# Patient Record
Sex: FEMALE | Race: WHITE | Employment: UNEMPLOYED
[De-identification: names, ages, dates, MRNs, and addresses within clinical notes are randomized per-mention and may not be internally consistent; named-entity substitution may affect disease eponyms.]

---

## 2017-04-28 ENCOUNTER — PRIOR ORIGINAL RECORDS (OUTPATIENT)
Dept: OTHER | Age: 62
End: 2017-04-28

## 2017-11-06 ENCOUNTER — PRIOR ORIGINAL RECORDS (OUTPATIENT)
Dept: OTHER | Age: 62
End: 2017-11-06

## 2017-11-27 ENCOUNTER — MYAURORA ACCOUNT LINK (OUTPATIENT)
Dept: OTHER | Age: 62
End: 2017-11-27

## 2017-11-27 ENCOUNTER — PRIOR ORIGINAL RECORDS (OUTPATIENT)
Dept: OTHER | Age: 62
End: 2017-11-27

## 2017-11-28 ENCOUNTER — PRIOR ORIGINAL RECORDS (OUTPATIENT)
Dept: OTHER | Age: 62
End: 2017-11-28

## 2017-12-11 ENCOUNTER — PRIOR ORIGINAL RECORDS (OUTPATIENT)
Dept: OTHER | Age: 62
End: 2017-12-11

## 2017-12-27 LAB
FREE T4: 1.1 MG/DL
THYROID STIMULATING HORMONE: 11.98 MLU/L
THYROID STIMULATING HORMONE: 11.98 MLU/L
VITAMIN D 25-OH: 57 NG/ML

## 2018-11-05 ENCOUNTER — MYAURORA ACCOUNT LINK (OUTPATIENT)
Dept: OTHER | Age: 63
End: 2018-11-05

## 2018-11-05 ENCOUNTER — PRIOR ORIGINAL RECORDS (OUTPATIENT)
Dept: OTHER | Age: 63
End: 2018-11-05

## 2018-11-09 ENCOUNTER — PRIOR ORIGINAL RECORDS (OUTPATIENT)
Dept: OTHER | Age: 63
End: 2018-11-09

## 2018-12-27 ENCOUNTER — PRIOR ORIGINAL RECORDS (OUTPATIENT)
Dept: OTHER | Age: 63
End: 2018-12-27

## 2019-02-28 VITALS
WEIGHT: 200 LBS | DIASTOLIC BLOOD PRESSURE: 60 MMHG | BODY MASS INDEX: 34.15 KG/M2 | OXYGEN SATURATION: 99 % | HEIGHT: 64 IN | HEART RATE: 112 BPM | SYSTOLIC BLOOD PRESSURE: 120 MMHG

## 2019-02-28 VITALS
BODY MASS INDEX: 34.3 KG/M2 | DIASTOLIC BLOOD PRESSURE: 60 MMHG | WEIGHT: 200.9 LBS | SYSTOLIC BLOOD PRESSURE: 100 MMHG | HEART RATE: 84 BPM | HEIGHT: 64 IN | RESPIRATION RATE: 16 BRPM

## 2019-02-28 VITALS — DIASTOLIC BLOOD PRESSURE: 60 MMHG | SYSTOLIC BLOOD PRESSURE: 96 MMHG | HEIGHT: 64 IN | HEART RATE: 98 BPM

## 2019-03-14 ENCOUNTER — APPOINTMENT (OUTPATIENT)
Dept: FAMILY MEDICINE | Age: 64
End: 2019-03-14

## 2019-03-26 ENCOUNTER — TELEPHONE (OUTPATIENT)
Dept: FAMILY MEDICINE | Age: 64
End: 2019-03-26

## 2019-03-26 ENCOUNTER — OFFICE VISIT (OUTPATIENT)
Dept: FAMILY MEDICINE | Age: 64
End: 2019-03-26

## 2019-03-26 VITALS
BODY MASS INDEX: 35.82 KG/M2 | HEIGHT: 65 IN | HEART RATE: 76 BPM | DIASTOLIC BLOOD PRESSURE: 86 MMHG | WEIGHT: 215 LBS | SYSTOLIC BLOOD PRESSURE: 118 MMHG

## 2019-03-26 DIAGNOSIS — E61.2 MAGNESIUM DEFICIENCY: ICD-10-CM

## 2019-03-26 DIAGNOSIS — E27.40 ADRENAL INSUFFICIENCY (CMD): ICD-10-CM

## 2019-03-26 DIAGNOSIS — R53.83 FATIGUE, UNSPECIFIED TYPE: ICD-10-CM

## 2019-03-26 DIAGNOSIS — E55.9 VITAMIN D DEFICIENCY: ICD-10-CM

## 2019-03-26 DIAGNOSIS — M79.7 FIBROMYALGIA: ICD-10-CM

## 2019-03-26 DIAGNOSIS — E53.8 B12 DEFICIENCY: ICD-10-CM

## 2019-03-26 DIAGNOSIS — E61.1 IRON DEFICIENCY: ICD-10-CM

## 2019-03-26 DIAGNOSIS — R53.81 PHYSICAL DECONDITIONING: Primary | ICD-10-CM

## 2019-03-26 DIAGNOSIS — R52 BODY ACHES: ICD-10-CM

## 2019-03-26 DIAGNOSIS — G47.9 SLEEP DISORDER: ICD-10-CM

## 2019-03-26 PROCEDURE — 99201 OFFICE/OUTPT VISIT,NEW,LEVL I: CPT | Performed by: FAMILY MEDICINE

## 2019-03-26 SDOH — HEALTH STABILITY: MENTAL HEALTH: HOW OFTEN DO YOU HAVE A DRINK CONTAINING ALCOHOL?: NEVER

## 2019-03-26 ASSESSMENT — PATIENT HEALTH QUESTIONNAIRE - PHQ9
1. LITTLE INTEREST OR PLEASURE IN DOING THINGS: NOT AT ALL
SUM OF ALL RESPONSES TO PHQ9 QUESTIONS 1 AND 2: 0
2. FEELING DOWN, DEPRESSED OR HOPELESS: NOT AT ALL
SUM OF ALL RESPONSES TO PHQ9 QUESTIONS 1 AND 2: 0

## 2019-05-22 ENCOUNTER — APPOINTMENT (OUTPATIENT)
Dept: CARDIOLOGY | Age: 64
End: 2019-05-22

## 2019-05-28 ENCOUNTER — APPOINTMENT (OUTPATIENT)
Dept: CARDIOLOGY | Age: 64
End: 2019-05-28

## 2019-05-28 ENCOUNTER — DOCUMENTATION (OUTPATIENT)
Dept: CARDIOLOGY | Age: 64
End: 2019-05-28

## 2019-05-29 ENCOUNTER — TELEPHONE (OUTPATIENT)
Dept: CARDIOLOGY | Age: 64
End: 2019-05-29

## 2019-10-16 RX ORDER — CYCLOBENZAPRINE HYDROCHLORIDE 7.5 MG/1
7.5 TABLET, FILM COATED ORAL AT BEDTIME
COMMUNITY
Start: 2018-11-05

## 2019-10-16 RX ORDER — ATENOLOL 50 MG/1
50 TABLET ORAL DAILY
COMMUNITY
End: 2019-10-21 | Stop reason: SDUPTHER

## 2019-10-16 RX ORDER — POTASSIUM CHLORIDE 750 MG/1
10 CAPSULE, EXTENDED RELEASE ORAL
COMMUNITY
Start: 2007-05-10 | End: 2019-10-21 | Stop reason: SDUPTHER

## 2019-10-16 RX ORDER — MULTIVIT-MIN/IRON FUM/FOLIC AC 7.5 MG-4
1 TABLET ORAL
COMMUNITY

## 2019-10-16 RX ORDER — LEVOTHYROXINE SODIUM 0.07 MG/1
75 TABLET ORAL DAILY
COMMUNITY
Start: 2019-05-16

## 2019-10-20 PROBLEM — I48.21 PERMANENT ATRIAL FIBRILLATION (CMD): Status: ACTIVE | Noted: 2019-10-20

## 2019-10-20 PROBLEM — G47.31 SLEEP APNEA, PRIMARY CENTRAL: Status: ACTIVE | Noted: 2019-10-20

## 2019-10-20 PROBLEM — E03.9 HYPOTHYROIDISM: Status: ACTIVE | Noted: 2019-10-20

## 2019-10-21 ENCOUNTER — OFFICE VISIT (OUTPATIENT)
Dept: CARDIOLOGY | Age: 64
End: 2019-10-21

## 2019-10-21 VITALS
BODY MASS INDEX: 35.32 KG/M2 | DIASTOLIC BLOOD PRESSURE: 60 MMHG | WEIGHT: 212 LBS | OXYGEN SATURATION: 98 % | HEIGHT: 65 IN | HEART RATE: 110 BPM | SYSTOLIC BLOOD PRESSURE: 110 MMHG

## 2019-10-21 DIAGNOSIS — E03.9 HYPOTHYROIDISM, UNSPECIFIED TYPE: ICD-10-CM

## 2019-10-21 DIAGNOSIS — I48.21 PERMANENT ATRIAL FIBRILLATION (CMD): Primary | ICD-10-CM

## 2019-10-21 DIAGNOSIS — G47.31 SLEEP APNEA, PRIMARY CENTRAL: ICD-10-CM

## 2019-10-21 PROCEDURE — 99214 OFFICE O/P EST MOD 30 MIN: CPT | Performed by: INTERNAL MEDICINE

## 2019-10-21 RX ORDER — POTASSIUM CHLORIDE 750 MG/1
10 CAPSULE, EXTENDED RELEASE ORAL DAILY
Qty: 100 CAPSULE | Refills: 3 | Status: SHIPPED | OUTPATIENT
Start: 2019-10-21 | End: 2019-11-08 | Stop reason: ALTCHOICE

## 2019-10-21 RX ORDER — ATENOLOL 50 MG/1
50 TABLET ORAL DAILY
Qty: 100 TABLET | Refills: 3 | Status: SHIPPED | OUTPATIENT
Start: 2019-10-21 | End: 2019-10-21 | Stop reason: DRUGHIGH

## 2019-10-21 RX ORDER — ATENOLOL 25 MG/1
TABLET ORAL
Qty: 90 TABLET | Refills: 3 | Status: SHIPPED
Start: 2019-10-21 | End: 2020-10-05

## 2019-10-21 SDOH — HEALTH STABILITY: MENTAL HEALTH: HOW OFTEN DO YOU HAVE A DRINK CONTAINING ALCOHOL?: NEVER

## 2019-10-21 ASSESSMENT — ENCOUNTER SYMPTOMS
SUSPICIOUS LESIONS: 0
COUGH: 0
HEMOPTYSIS: 0
ALLERGIC/IMMUNOLOGIC COMMENTS: NO NEW FOOD ALLERGIES
WEIGHT GAIN: 0
CHILLS: 0
FEVER: 0
HEMATOCHEZIA: 0
WEIGHT LOSS: 0
BRUISES/BLEEDS EASILY: 0

## 2019-10-21 ASSESSMENT — PATIENT HEALTH QUESTIONNAIRE - PHQ9
1. LITTLE INTEREST OR PLEASURE IN DOING THINGS: NOT AT ALL
SUM OF ALL RESPONSES TO PHQ9 QUESTIONS 1 AND 2: 0
SUM OF ALL RESPONSES TO PHQ9 QUESTIONS 1 AND 2: 0
2. FEELING DOWN, DEPRESSED OR HOPELESS: NOT AT ALL

## 2019-11-08 ENCOUNTER — TELEPHONE (OUTPATIENT)
Dept: CARDIOLOGY | Age: 64
End: 2019-11-08

## 2019-11-08 DIAGNOSIS — I48.21 PERMANENT ATRIAL FIBRILLATION (CMD): ICD-10-CM

## 2019-11-08 RX ORDER — POTASSIUM CHLORIDE 750 MG/1
CAPSULE, EXTENDED RELEASE ORAL
COMMUNITY
Start: 2007-05-10 | End: 2019-11-08 | Stop reason: ALTCHOICE

## 2019-11-08 RX ORDER — POTASSIUM CHLORIDE 750 MG/1
TABLET, EXTENDED RELEASE ORAL
Qty: 100 TABLET | Refills: 5 | Status: SHIPPED | OUTPATIENT
Start: 2019-11-08 | End: 2020-07-02 | Stop reason: SDUPTHER

## 2019-11-08 RX ORDER — POTASSIUM CHLORIDE 750 MG/1
10 TABLET, EXTENDED RELEASE ORAL
COMMUNITY
Start: 2018-11-05 | End: 2019-11-08 | Stop reason: SDUPTHER

## 2019-11-18 ENCOUNTER — OFFICE VISIT (OUTPATIENT)
Dept: CARDIOLOGY | Age: 64
End: 2019-11-18

## 2019-11-18 VITALS
OXYGEN SATURATION: 97 % | HEIGHT: 65 IN | WEIGHT: 216.3 LBS | DIASTOLIC BLOOD PRESSURE: 80 MMHG | HEART RATE: 92 BPM | SYSTOLIC BLOOD PRESSURE: 118 MMHG | BODY MASS INDEX: 36.04 KG/M2

## 2019-11-18 DIAGNOSIS — I48.21 PERMANENT ATRIAL FIBRILLATION (CMD): Primary | ICD-10-CM

## 2019-11-18 PROCEDURE — 99213 OFFICE O/P EST LOW 20 MIN: CPT | Performed by: NURSE PRACTITIONER

## 2019-11-18 SDOH — HEALTH STABILITY: MENTAL HEALTH: HOW OFTEN DO YOU HAVE A DRINK CONTAINING ALCOHOL?: NEVER

## 2019-11-18 ASSESSMENT — ENCOUNTER SYMPTOMS
CHILLS: 0
BRUISES/BLEEDS EASILY: 0
HEMATOCHEZIA: 0
WEIGHT GAIN: 0
HEMOPTYSIS: 0
COUGH: 0
FEVER: 0
WEIGHT LOSS: 0
SUSPICIOUS LESIONS: 0

## 2020-03-11 ENCOUNTER — OFFICE VISIT (OUTPATIENT)
Dept: ENDOCRINOLOGY CLINIC | Facility: CLINIC | Age: 65
End: 2020-03-11
Payer: MEDICARE

## 2020-03-11 VITALS
HEART RATE: 82 BPM | BODY MASS INDEX: 38 KG/M2 | SYSTOLIC BLOOD PRESSURE: 117 MMHG | DIASTOLIC BLOOD PRESSURE: 72 MMHG | WEIGHT: 219 LBS

## 2020-03-11 DIAGNOSIS — E03.8 HYPOTHYROIDISM DUE TO HASHIMOTO'S THYROIDITIS: Primary | ICD-10-CM

## 2020-03-11 DIAGNOSIS — E06.3 HYPOTHYROIDISM DUE TO HASHIMOTO'S THYROIDITIS: Primary | ICD-10-CM

## 2020-03-11 PROCEDURE — 99204 OFFICE O/P NEW MOD 45 MIN: CPT | Performed by: INTERNAL MEDICINE

## 2020-03-11 PROCEDURE — G0463 HOSPITAL OUTPT CLINIC VISIT: HCPCS | Performed by: INTERNAL MEDICINE

## 2020-03-11 RX ORDER — LIOTHYRONINE SODIUM 5 UG/1
5 TABLET ORAL DAILY
Qty: 90 TABLET | Refills: 1 | Status: SHIPPED | OUTPATIENT
Start: 2020-03-11 | End: 2020-08-21

## 2020-03-11 RX ORDER — LEVOTHYROXINE SODIUM 0.07 MG/1
75 TABLET ORAL
Qty: 90 TABLET | Refills: 1 | Status: SHIPPED | OUTPATIENT
Start: 2020-03-11 | End: 2020-08-21

## 2020-03-11 NOTE — PROGRESS NOTES
Name: Clovis Slipper  Date: 3/11/2020    Referring Physician: No ref. provider found    Patient presents with:  Consult: Pt would like to establish care with Endocrinologist to help manage thyroid and adrenal issues.        HISTORY OF PRESENT ILLNESS   Lauryn daily as directed, Disp: , Rfl: 9  •  atenolol 50 MG Oral Tab, Take 50 mg by mouth daily. , Disp: , Rfl:   •  Levothyroxine Sodium 88 MCG Oral Tab, Take 88 mcg by mouth before breakfast., Disp: , Rfl:   •  Cyclobenzaprine HCl 7.5 MG Oral Tab, Take 7.5 mg by & Nails:  normal scalp hair     Neuro:  sensory grossly intact and motor grossly intact  Psychiatric:  oriented to time, self, and place  Nutritional:  no abnormal weight gain or loss    ASSESSMENT/PLAN:    1.  Hypothyroidism  - Discussed diagnosis with soren

## 2020-06-08 ENCOUNTER — APPOINTMENT (OUTPATIENT)
Dept: CARDIOLOGY | Age: 65
End: 2020-06-08

## 2020-06-10 ENCOUNTER — TELEPHONE (OUTPATIENT)
Dept: ENDOCRINOLOGY CLINIC | Facility: CLINIC | Age: 65
End: 2020-06-10

## 2020-06-10 DIAGNOSIS — E06.3 HYPOTHYROIDISM DUE TO HASHIMOTO'S THYROIDITIS: Primary | ICD-10-CM

## 2020-06-10 DIAGNOSIS — E03.8 HYPOTHYROIDISM DUE TO HASHIMOTO'S THYROIDITIS: Primary | ICD-10-CM

## 2020-06-10 NOTE — TELEPHONE ENCOUNTER
Called patient back and informed her we will mail her orders for labs and ultrasound  Per dr Vidhya Gant verbal no need to fast and may take medication am of testing. Per lov: 3/11/20  Repeat labs in 2 mo. Orders placed printed and mailed.

## 2020-06-10 NOTE — TELEPHONE ENCOUNTER
Pt states she would like to have her lab orders in her hand and ask if you could mail them to her. .. I told her they were in the system and she got Very angry and wants them in her hand, Please advise between 10 and noon. Jam Downing

## 2020-07-01 ENCOUNTER — TELEPHONE (OUTPATIENT)
Dept: CARDIOLOGY | Age: 65
End: 2020-07-01

## 2020-07-02 RX ORDER — POTASSIUM CHLORIDE 750 MG/1
TABLET, EXTENDED RELEASE ORAL
Qty: 100 TABLET | Refills: 5 | Status: SHIPPED | COMMUNITY
Start: 2020-07-02 | End: 2021-04-29 | Stop reason: SDUPTHER

## 2020-07-07 ENCOUNTER — HOSPITAL ENCOUNTER (OUTPATIENT)
Dept: ULTRASOUND IMAGING | Age: 65
Discharge: HOME | End: 2020-07-07
Attending: INTERNAL MEDICINE
Payer: MEDICARE

## 2020-07-07 DIAGNOSIS — E03.8 HYPOTHYROIDISM DUE TO HASHIMOTO'S THYROIDITIS: ICD-10-CM

## 2020-07-07 DIAGNOSIS — E06.3 HYPOTHYROIDISM DUE TO HASHIMOTO'S THYROIDITIS: ICD-10-CM

## 2020-07-07 PROCEDURE — 76536 US EXAM OF HEAD AND NECK: CPT | Performed by: INTERNAL MEDICINE

## 2020-07-09 ENCOUNTER — TELEPHONE (OUTPATIENT)
Dept: ENDOCRINOLOGY CLINIC | Facility: CLINIC | Age: 65
End: 2020-07-09

## 2020-07-09 NOTE — TELEPHONE ENCOUNTER
Please call patient - thyroid ultrasound does demonstrate one small nodule which is benign in appearance. No need for any further evaluation at this time.

## 2020-07-10 NOTE — TELEPHONE ENCOUNTER
Pt was contacted and discussed results as outlined below.   Pt voiced understanding and denied further questions at this time    She states that she provided copies of her previous ultrasounds before to the sonographer and would like to make sure that there

## 2020-07-16 NOTE — TELEPHONE ENCOUNTER
Patient requesting copies of Thyroid U/S results to be sent to home address:    [address] [place] [address].     For additional questions please call. Thank you.

## 2020-07-17 NOTE — TELEPHONE ENCOUNTER
Thyroid ultrasound result from 07/07/20 mailed to patient as requested.      Also called medical records to discuss if they would have copies of the thyroid ultrasound reports the patient gave to the technologist.  If they are not the proper department, RN

## 2020-07-22 NOTE — TELEPHONE ENCOUNTER
Pt called for update on records. Pt is very upset that she hasn't heard back from this office and would like a call back today between 10am and 12 noon. Please call.

## 2020-07-22 NOTE — TELEPHONE ENCOUNTER
Thyroid ultrasound was mailed on 07/17/20, which is the day after she requested it. RN spoke to [address] [place] [address] records and states they don't have anything on file pertaining to the records she provided when she had her thyroid ultrasound on 07/07/20.     Pa

## 2020-07-23 NOTE — TELEPHONE ENCOUNTER
Reviewed US and compared to previous reports. Overall the images are fairly stable - there is some variation in the radiologist interpretation. But the nodule in 2013 was 6mm, 7mm in 2004 and currently 8mm on new imaging.   So given differences in techniq

## 2020-07-23 NOTE — TELEPHONE ENCOUNTER
Contacted Radiology department again and spoke to [id number] [place] [address] who states patient would have to sign a release form before they can release records to RN/office.   RN explained situation that we're the endocrinology managing patient's thyroid and gave the reports

## 2020-07-23 NOTE — TELEPHONE ENCOUNTER
Ok, noted. Lets fax the reports over to the radiology department. I would just call admin for radiology and let them know patient is asking radiologist to review the reports. Thanks.

## 2020-07-23 NOTE — TELEPHONE ENCOUNTER
Dr. Johana Che,     Patient was contacted and relayed your message as outlined below. She is also requesting that we get in touch with the radiologist to have him/her review the old reports and compare. States that's the reason why she brought them in.   Tyler

## 2020-07-23 NOTE — TELEPHONE ENCOUNTER
Another letter generated to include Dr. Dill Credit very first message regarding results. Letter generated mailed to patient's home.

## 2020-07-23 NOTE — TELEPHONE ENCOUNTER
There was difficulty receiving fax from [facility] location. Buster Boxer was able to email reports to RN and now received.      Dr. Lora Baker -- please review patient's previous thyroid ultrasound per her request and compare to recent thyroid ultrasound report, which was a

## 2020-07-23 NOTE — TELEPHONE ENCOUNTER
Routed to [facility] as FYI    Pt verbalized understanding that thyroid nodule is stable per [facility] review of US reports per below. RN advised pt to call Medical Records at [phone] if she would like to have images compared by radiologist per below.  Pt states

## 2020-08-21 ENCOUNTER — TELEPHONE (OUTPATIENT)
Dept: ENDOCRINOLOGY CLINIC | Facility: CLINIC | Age: 65
End: 2020-08-21

## 2020-08-21 DIAGNOSIS — E03.8 HYPOTHYROIDISM DUE TO HASHIMOTO'S THYROIDITIS: Primary | ICD-10-CM

## 2020-08-21 DIAGNOSIS — E06.3 HYPOTHYROIDISM DUE TO HASHIMOTO'S THYROIDITIS: Primary | ICD-10-CM

## 2020-08-21 LAB
T3, FREE: 3.1 PG/ML (ref 2.3–4.2)
T4, FREE: 1.1 NG/DL (ref 0.8–1.8)
TSH: 4.57 MIU/L (ref 0.4–4.5)

## 2020-08-21 RX ORDER — LIOTHYRONINE SODIUM 5 UG/1
5 TABLET ORAL DAILY
Qty: 100 TABLET | Refills: 1 | OUTPATIENT
Start: 2020-08-21 | End: 2020-08-21

## 2020-08-21 RX ORDER — LIOTHYRONINE SODIUM 5 UG/1
5 TABLET ORAL DAILY
Qty: 100 TABLET | Refills: 1 | Status: SHIPPED | OUTPATIENT
Start: 2020-08-21 | End: 2020-08-26

## 2020-08-21 RX ORDER — LEVOTHYROXINE SODIUM 88 UG/1
88 TABLET ORAL
Qty: 100 TABLET | Refills: 0 | Status: SHIPPED | OUTPATIENT
Start: 2020-08-21 | End: 2020-08-26 | Stop reason: DRUGHIGH

## 2020-08-21 RX ORDER — LEVOTHYROXINE SODIUM 88 UG/1
88 TABLET ORAL
Qty: 100 TABLET | Refills: 0 | OUTPATIENT
Start: 2020-08-21 | End: 2020-08-21

## 2020-08-21 NOTE — TELEPHONE ENCOUNTER
Please call patient - Thyroid levels are not at goal.  Increase Levothyroxine to 88mcg PO daily #90, refill 1 and continue liothyronine. Recheck TSH, FT4, FT3 in 2 months. Thanks.

## 2020-08-21 NOTE — TELEPHONE ENCOUNTER
rn called patient with dr Soledad Blankenship recommendations. Patient agreed to increase dose . She is requesting written rx mailed to her since she wants #100 pills unopen bottle of medications  Also asking for [facility] orders mailed to her.  States last time the orders d

## 2020-08-24 NOTE — TELEPHONE ENCOUNTER
Received fax from [facility]:    Lab results. Dr. Mendel Downy already addressed results.

## 2020-08-25 NOTE — TELEPHONE ENCOUNTER
Patient was calling because she was thinking about the adjustment provider wanted to make to her Levothyroxine and since the medication can cause her heart rate to increase she changed her mind and wanted to instead   1 continue the liothyronine 5mcg dly

## 2020-08-26 RX ORDER — LIOTHYRONINE SODIUM 5 UG/1
5 TABLET ORAL DAILY
Qty: 100 TABLET | Refills: 1 | Status: SHIPPED | OUTPATIENT
Start: 2020-08-26 | End: 2020-09-28

## 2020-08-26 RX ORDER — LEVOTHYROXINE SODIUM 0.07 MG/1
75 TABLET ORAL
Qty: 100 TABLET | Refills: 1 | Status: SHIPPED | OUTPATIENT
Start: 2020-08-26 | End: 2021-03-10

## 2020-08-26 NOTE — TELEPHONE ENCOUNTER
Pt is agreeable and verbalized understanding to CPM Lt4 75 mcg and lt3 5 mcg daily and repeat labs in 2 months. RN mailed printed script to patient home along with lab orders as requested.

## 2020-08-26 NOTE — TELEPHONE ENCOUNTER
RN spoke with patient. She is concerned about her heart rate being elevated on increased dose.      Pt taking T4 75 mcg--> 88 mcg daily  C/w T3 5 mcg daily    Pt states last time she was on 88 mcg daily, her cardiologist had to lower the dosage out of luigi

## 2020-08-26 NOTE — TELEPHONE ENCOUNTER
Ok, noted. Since her labs are very minimally elevated lets continue current dose. Particularly if she is feeling well. Repeat labs in 2 months to monitor. Thanks.

## 2020-08-26 NOTE — TELEPHONE ENCOUNTER
They do not make a T4 5mcg tablet - is this something she is planning to get from a compounding pharmacy? Or a supplement?

## 2020-10-01 DIAGNOSIS — I48.21 PERMANENT ATRIAL FIBRILLATION (CMD): ICD-10-CM

## 2020-10-05 RX ORDER — ATENOLOL 25 MG/1
TABLET ORAL
Qty: 100 TABLET | Refills: 0 | Status: SHIPPED | OUTPATIENT
Start: 2020-10-05 | End: 2020-11-09

## 2020-10-19 ENCOUNTER — APPOINTMENT (OUTPATIENT)
Dept: CARDIOLOGY | Age: 65
End: 2020-10-19

## 2020-10-19 ENCOUNTER — TELEPHONE (OUTPATIENT)
Dept: CARDIOLOGY | Age: 65
End: 2020-10-19

## 2020-10-28 ENCOUNTER — OFFICE VISIT (OUTPATIENT)
Dept: CARDIOLOGY | Age: 65
End: 2020-10-28

## 2020-10-28 DIAGNOSIS — I48.21 PERMANENT ATRIAL FIBRILLATION (CMD): Primary | ICD-10-CM

## 2020-10-28 DIAGNOSIS — G47.31 SLEEP APNEA, PRIMARY CENTRAL: ICD-10-CM

## 2020-10-28 PROBLEM — G47.30 SLEEP APNEA: Status: ACTIVE | Noted: 2020-10-28

## 2020-10-28 PROCEDURE — 99214 OFFICE O/P EST MOD 30 MIN: CPT | Performed by: INTERNAL MEDICINE

## 2020-10-28 SDOH — HEALTH STABILITY: MENTAL HEALTH: HOW OFTEN DO YOU HAVE A DRINK CONTAINING ALCOHOL?: NEVER

## 2020-10-28 ASSESSMENT — PATIENT HEALTH QUESTIONNAIRE - PHQ9
SUM OF ALL RESPONSES TO PHQ9 QUESTIONS 1 AND 2: 2
SUM OF ALL RESPONSES TO PHQ9 QUESTIONS 1 AND 2: 2
2. FEELING DOWN, DEPRESSED OR HOPELESS: SEVERAL DAYS
1. LITTLE INTEREST OR PLEASURE IN DOING THINGS: SEVERAL DAYS
CLINICAL INTERPRETATION OF PHQ2 SCORE: NO FURTHER SCREENING NEEDED
CLINICAL INTERPRETATION OF PHQ9 SCORE: NO FURTHER SCREENING NEEDED

## 2020-10-28 ASSESSMENT — ENCOUNTER SYMPTOMS
HEADACHES: 0
SHORTNESS OF BREATH: 0
BRUISES/BLEEDS EASILY: 0
HEMATEMESIS: 0
WEIGHT GAIN: 0
SUSPICIOUS LESIONS: 0
WEIGHT LOSS: 0
COUGH: 0
CHILLS: 0
LIGHT-HEADEDNESS: 0
HEMATOCHEZIA: 0
FEVER: 0
HEMOPTYSIS: 0

## 2020-10-28 ASSESSMENT — PAIN SCALES - GENERAL: PAINLEVEL: 0

## 2020-11-04 ENCOUNTER — ANCILLARY PROCEDURE (OUTPATIENT)
Dept: CARDIOLOGY | Age: 65
End: 2020-11-04
Attending: INTERNAL MEDICINE

## 2020-11-04 DIAGNOSIS — I48.21 PERMANENT ATRIAL FIBRILLATION (CMD): ICD-10-CM

## 2020-11-04 PROCEDURE — 93306 TTE W/DOPPLER COMPLETE: CPT | Performed by: INTERNAL MEDICINE

## 2020-11-05 ENCOUNTER — TELEPHONE (OUTPATIENT)
Dept: ENDOCRINOLOGY CLINIC | Facility: CLINIC | Age: 65
End: 2020-11-05

## 2020-11-05 DIAGNOSIS — E03.9 HYPOTHYROIDISM, UNSPECIFIED TYPE: Primary | ICD-10-CM

## 2020-11-05 NOTE — TELEPHONE ENCOUNTER
Her labs did demonstrate that she wasn't getting enough thyroid hormone in 8/2020 which may be contributing to fatigue. Since she stopped the T3 I do think we should reconsider increasing levothyroxine to 88mcg PO daily.   Or we could just recheck TSH, FT4

## 2020-11-05 NOTE — TELEPHONE ENCOUNTER
Patient states based on her cardiologist she stop taking the T3  Pills , she is afib and gets tachy  , only on levothyroxine 75 mcg dly. Does she need to do all the blood tests including T3, T4 and tsh?   And states she feels same as she did before , no en

## 2020-11-06 ENCOUNTER — TELEPHONE (OUTPATIENT)
Dept: CARDIOLOGY | Age: 65
End: 2020-11-06

## 2020-11-06 NOTE — TELEPHONE ENCOUNTER
Spoke with Kaykay Gilbert. She would like to opt for checking her labs again first before increasing her dose.  She said she has discussed this with her cardiologist and is worried that too much thyroid hormone could be leading to tachycardia, although I let her k

## 2020-11-08 DIAGNOSIS — I48.21 PERMANENT ATRIAL FIBRILLATION (CMD): ICD-10-CM

## 2020-11-09 RX ORDER — ATENOLOL 25 MG/1
TABLET ORAL
Qty: 100 TABLET | Refills: 3 | Status: SHIPPED | OUTPATIENT
Start: 2020-11-09 | End: 2021-04-29 | Stop reason: SDUPTHER

## 2020-12-07 ENCOUNTER — TELEPHONE (OUTPATIENT)
Dept: ENDOCRINOLOGY CLINIC | Facility: CLINIC | Age: 65
End: 2020-12-07

## 2020-12-07 DIAGNOSIS — E03.9 HYPOTHYROIDISM, UNSPECIFIED TYPE: Primary | ICD-10-CM

## 2020-12-07 NOTE — TELEPHONE ENCOUNTER
Please call patient - unfortunately her thyroid levels have gotten worst instead of better. Please increase Levothyroxine to 88mcg PO daily #90, refill 1 and recheck TSH, FT4 in 2-3 months. Thanks.

## 2020-12-07 NOTE — TELEPHONE ENCOUNTER
Ok, noted. That is fine. Lets recheck her TSH, FT4 in March for yearly labs and schedule visit at that time. She will need visit in March for yearly follow up. Thanks.

## 2020-12-07 NOTE — TELEPHONE ENCOUNTER
Spoke with Bubba Omer MD message below. Mailed lab orders, per patient request. She will contact the clinic later to schedule a follow up appointment.

## 2020-12-07 NOTE — TELEPHONE ENCOUNTER
Dr. Donald Davidson, Arben Hercules)  Spoke to patient to relay message below - patient states that she does not want to change her dose of levothyroxine at this time d/t her A-Fib.   She states she met with her cardiologist recently and everything was status quo so she doesn'

## 2021-03-10 ENCOUNTER — TELEPHONE (OUTPATIENT)
Dept: ENDOCRINOLOGY CLINIC | Facility: CLINIC | Age: 66
End: 2021-03-10

## 2021-03-10 RX ORDER — LEVOTHYROXINE SODIUM 0.07 MG/1
TABLET ORAL
Qty: 100 TABLET | Refills: 0 | Status: SHIPPED | OUTPATIENT
Start: 2021-03-10 | End: 2021-06-04

## 2021-03-10 NOTE — TELEPHONE ENCOUNTER
Pt called to speak to RN about a prescription - pt did not want to leave any further details. Please call.

## 2021-03-11 NOTE — TELEPHONE ENCOUNTER
I spoke with the patient. She states that the pharmacy did not receive the escript for levothyroxine sent yesterday. I confirmed the pharmacy with the patient. Called the pharmacy and spoke with pharmacist Fabien.  Provided verbal order as written with read

## 2021-03-11 NOTE — TELEPHONE ENCOUNTER
Spoke to patient - advised refill of 75mcg levothyroxine (quantity 100) sent to [facility] today.   Patient stated understanding

## 2021-04-08 RX ORDER — POTASSIUM CHLORIDE 750 MG/1
CAPSULE, EXTENDED RELEASE ORAL
Qty: 100 CAPSULE | Refills: 0 | Status: SHIPPED | OUTPATIENT
Start: 2021-04-08 | End: 2021-04-29 | Stop reason: SDUPTHER

## 2021-04-29 ENCOUNTER — TELEPHONE (OUTPATIENT)
Dept: CARDIOLOGY | Age: 66
End: 2021-04-29

## 2021-04-29 DIAGNOSIS — I48.21 PERMANENT ATRIAL FIBRILLATION (CMD): ICD-10-CM

## 2021-04-29 RX ORDER — ATENOLOL 25 MG/1
TABLET ORAL
Qty: 100 TABLET | Refills: 6 | Status: SHIPPED | OUTPATIENT
Start: 2021-04-29 | End: 2021-12-01 | Stop reason: SDUPTHER

## 2021-04-29 RX ORDER — POTASSIUM CHLORIDE 750 MG/1
TABLET, EXTENDED RELEASE ORAL
Qty: 100 TABLET | Refills: 6 | Status: SHIPPED | OUTPATIENT
Start: 2021-04-29 | End: 2021-12-01 | Stop reason: SDUPTHER

## 2021-04-29 RX ORDER — ATENOLOL 25 MG/1
TABLET ORAL
Qty: 100 TABLET | Refills: 1 | Status: SHIPPED | OUTPATIENT
Start: 2021-04-29 | End: 2021-06-23 | Stop reason: ALTCHOICE

## 2021-04-30 RX ORDER — POTASSIUM CHLORIDE 750 MG/1
CAPSULE, EXTENDED RELEASE ORAL
Qty: 100 CAPSULE | Refills: 1 | Status: SHIPPED | OUTPATIENT
Start: 2021-04-30 | End: 2021-08-09

## 2021-05-25 VITALS
OXYGEN SATURATION: 98 % | TEMPERATURE: 98.2 F | BODY MASS INDEX: 35.65 KG/M2 | DIASTOLIC BLOOD PRESSURE: 70 MMHG | HEART RATE: 77 BPM | SYSTOLIC BLOOD PRESSURE: 110 MMHG | WEIGHT: 214 LBS | HEIGHT: 65 IN

## 2021-06-03 ENCOUNTER — TELEPHONE (OUTPATIENT)
Dept: ENDOCRINOLOGY CLINIC | Facility: CLINIC | Age: 66
End: 2021-06-03

## 2021-06-03 DIAGNOSIS — E03.9 HYPOTHYROIDISM, UNSPECIFIED TYPE: Primary | ICD-10-CM

## 2021-06-03 NOTE — TELEPHONE ENCOUNTER
Pt called in has questions about a prescription. She did not go into details wants to speak directly to Rn.  Please follow up

## 2021-06-04 RX ORDER — LEVOTHYROXINE SODIUM 0.07 MG/1
75 TABLET ORAL
Qty: 100 TABLET | Refills: 2 | Status: SHIPPED | OUTPATIENT
Start: 2021-06-04 | End: 2021-09-02

## 2021-06-04 NOTE — TELEPHONE ENCOUNTER
Tried calling patient to advise RX is ready - per pharmacy: RX was run as 100 tablets for 90 days so that patient can received sealed bottle

## 2021-06-04 NOTE — TELEPHONE ENCOUNTER
Dr. Karin Adame,  Patient requesting letter to insurance to cover dispensing sealed bottle (quantity 100 tablets) of levothyroxine 75mcg d/t sensitivity to chemicals - letter pended    LOV: 3/11/20  F/U: 10/13/21 (patient requesting apt after 2pm in [place])

## 2021-06-04 NOTE — TELEPHONE ENCOUNTER
Appeal letter faxed to [phone]    Patient notified letter faxed and RX sent to pharmacy    Patient requesting we follow up with [employer] regarding appeal

## 2021-06-09 ENCOUNTER — APPOINTMENT (OUTPATIENT)
Dept: CARDIOLOGY | Age: 66
End: 2021-06-09

## 2021-06-23 ENCOUNTER — OFFICE VISIT (OUTPATIENT)
Dept: CARDIOLOGY | Age: 66
End: 2021-06-23

## 2021-06-23 VITALS — HEART RATE: 96 BPM | SYSTOLIC BLOOD PRESSURE: 120 MMHG | OXYGEN SATURATION: 96 % | DIASTOLIC BLOOD PRESSURE: 65 MMHG

## 2021-06-23 DIAGNOSIS — I48.21 PERMANENT ATRIAL FIBRILLATION (CMD): Primary | ICD-10-CM

## 2021-06-23 DIAGNOSIS — R60.0 LOCALIZED EDEMA: ICD-10-CM

## 2021-06-23 PROCEDURE — 99214 OFFICE O/P EST MOD 30 MIN: CPT | Performed by: NURSE PRACTITIONER

## 2021-06-23 ASSESSMENT — ENCOUNTER SYMPTOMS
FEVER: 0
BRUISES/BLEEDS EASILY: 0
HEMATEMESIS: 0
CHILLS: 0
SUSPICIOUS LESIONS: 0
HEMOPTYSIS: 0
WEIGHT LOSS: 0
COUGH: 0
SHORTNESS OF BREATH: 0
WEIGHT GAIN: 0
LIGHT-HEADEDNESS: 0
HEMATOCHEZIA: 0

## 2021-06-25 ENCOUNTER — TELEPHONE (OUTPATIENT)
Dept: CARDIOLOGY | Age: 66
End: 2021-06-25

## 2021-08-09 RX ORDER — POTASSIUM CHLORIDE 750 MG/1
CAPSULE, EXTENDED RELEASE ORAL
Qty: 100 CAPSULE | Refills: 0 | Status: SHIPPED | OUTPATIENT
Start: 2021-08-09 | End: 2021-09-27

## 2021-08-31 LAB
T4, FREE: 1.3 NG/DL (ref 0.8–1.8)
TSH: 9.26 MIU/L (ref 0.4–4.5)

## 2021-09-01 ENCOUNTER — TELEPHONE (OUTPATIENT)
Dept: ENDOCRINOLOGY CLINIC | Facility: CLINIC | Age: 66
End: 2021-09-01

## 2021-09-01 DIAGNOSIS — E03.9 HYPOTHYROIDISM, UNSPECIFIED TYPE: ICD-10-CM

## 2021-09-01 NOTE — TELEPHONE ENCOUNTER
Please call patient - thyroid levels are still not at goal.  Increase Levothyroxine to 100mcg PO daily #90, refill 1 and recheck TSH, FT4 before visit. Thanks.

## 2021-09-01 NOTE — TELEPHONE ENCOUNTER
David French,    Patient stated that she does not want to increase her LT4 medication to 100 mcg. Patient stated her cardiologist advised her to not increase thyroid medication due to having Afib.  Patient stated her cardiologist is her Chilango Blankenship" and she

## 2021-09-01 NOTE — TELEPHONE ENCOUNTER
Tried to call patient, one number listed. Phone kept ringing and then number was disconnected. Will try again later. Patient does not have mychart.

## 2021-09-02 RX ORDER — LEVOTHYROXINE SODIUM 0.07 MG/1
75 TABLET ORAL
Qty: 100 TABLET | Refills: 2 | Status: SHIPPED | OUTPATIENT
Start: 2021-09-02 | End: 2021-09-13

## 2021-09-02 NOTE — TELEPHONE ENCOUNTER
Hi Dr. Yoni Couch to patient again, patient stated she just wants to keep her thyroid medication the same until she meets with her cardiologist. Patient has requested to be mailed a new prescription for 75 mcg to her home address.  Is this something we

## 2021-09-02 NOTE — TELEPHONE ENCOUNTER
Pt is calling in requesting for the recent test results to be sent to her home address.  Please follow up

## 2021-09-02 NOTE — TELEPHONE ENCOUNTER
Ok that is fine. Lets increase to Levothyroxine 88mcg PO daily #90, refill 1 and recheck TSH, FT4 before visit. Thanks.

## 2021-09-13 ENCOUNTER — TELEPHONE (OUTPATIENT)
Dept: ENDOCRINOLOGY CLINIC | Facility: CLINIC | Age: 66
End: 2021-09-13

## 2021-09-13 RX ORDER — LEVOTHYROXINE SODIUM 0.07 MG/1
75 TABLET ORAL
Qty: 100 TABLET | Refills: 2 | OUTPATIENT
Start: 2021-09-13

## 2021-09-13 RX ORDER — LEVOTHYROXINE SODIUM 0.07 MG/1
75 TABLET ORAL
Qty: 100 TABLET | Refills: 2 | Status: SHIPPED | OUTPATIENT
Start: 2021-09-13 | End: 2021-09-13

## 2021-09-13 RX ORDER — LEVOTHYROXINE SODIUM 0.07 MG/1
75 TABLET ORAL
Qty: 100 TABLET | Refills: 2 | Status: SHIPPED | OUTPATIENT
Start: 2021-09-13 | End: 2021-09-13 | Stop reason: CLARIF

## 2021-09-13 RX ORDER — LEVOTHYROXINE SODIUM 0.07 MG/1
75 TABLET ORAL
Qty: 100 TABLET | Refills: 2 | Status: SHIPPED | OUTPATIENT
Start: 2021-09-13

## 2021-09-13 NOTE — TELEPHONE ENCOUNTER
Spoke to patient who inquired about prescription being mailed as well as appeal letter from 6/21. Sent both electronic prescription and appeal letter to be mailed to patient's home address. [address] to cancel Levothyroxine prescription.  Milton

## 2021-09-13 NOTE — TELEPHONE ENCOUNTER
Pt states Ton Green was supposed to call her back the week before last and she is Still waiting for that phone call.  Please call

## 2021-09-27 RX ORDER — POTASSIUM CHLORIDE 750 MG/1
CAPSULE, EXTENDED RELEASE ORAL
Qty: 100 CAPSULE | Refills: 0 | Status: SHIPPED | OUTPATIENT
Start: 2021-09-27 | End: 2021-10-20

## 2021-10-13 ENCOUNTER — OFFICE VISIT (OUTPATIENT)
Dept: ENDOCRINOLOGY CLINIC | Facility: CLINIC | Age: 66
End: 2021-10-13
Payer: MEDICARE

## 2021-10-13 VITALS — DIASTOLIC BLOOD PRESSURE: 70 MMHG | HEART RATE: 99 BPM | SYSTOLIC BLOOD PRESSURE: 105 MMHG

## 2021-10-13 DIAGNOSIS — E03.9 HYPOTHYROIDISM, UNSPECIFIED TYPE: Primary | ICD-10-CM

## 2021-10-13 PROCEDURE — 99213 OFFICE O/P EST LOW 20 MIN: CPT | Performed by: INTERNAL MEDICINE

## 2021-10-13 RX ORDER — LEVOTHYROXINE SODIUM 88 UG/1
88 TABLET ORAL
Qty: 90 TABLET | Refills: 1 | Status: SHIPPED | OUTPATIENT
Start: 2021-10-13

## 2021-10-13 NOTE — PROGRESS NOTES
Name: Donavan Navas  Date: 10/13/2021    Referring Physician: No ref. provider found    Patient presents with:  Hypothyroidism: Patient following up to review thyroid medication/labs.       HISTORY OF PRESENT ILLNESS   Donavan Navas is a 78year old female wh 7.5 MG Oral Tab, Take 7.5 mg by mouth 3 (three) times daily as needed for Muscle spasms. , Disp: , Rfl:   •  Multiple Vitamins-Minerals (MULTI-VITAMIN/MINERALS) Oral Tab, Take 1 tablet by mouth daily. , Disp: , Rfl:      Allergies:     Penicillins cardiology declines change   - Discussed importance of taking medication in AM and waiting 30-60 min before eating  - Recheck TSH, FT4, FT3 in 2 months  - Repeat Thyroid US 2022    RTC 1 year     10/13/2021  Leland Lin MD

## 2021-10-14 ENCOUNTER — OFFICE VISIT (OUTPATIENT)
Dept: CARDIOLOGY | Age: 66
End: 2021-10-14

## 2021-10-14 VITALS — OXYGEN SATURATION: 98 % | DIASTOLIC BLOOD PRESSURE: 70 MMHG | SYSTOLIC BLOOD PRESSURE: 122 MMHG | HEART RATE: 88 BPM

## 2021-10-14 DIAGNOSIS — I48.21 PERMANENT ATRIAL FIBRILLATION (CMD): Primary | ICD-10-CM

## 2021-10-14 DIAGNOSIS — G47.31 SLEEP APNEA, PRIMARY CENTRAL: ICD-10-CM

## 2021-10-14 DIAGNOSIS — G47.30 SLEEP APNEA, UNSPECIFIED TYPE: ICD-10-CM

## 2021-10-14 DIAGNOSIS — E03.9 ACQUIRED HYPOTHYROIDISM: ICD-10-CM

## 2021-10-14 PROCEDURE — 99215 OFFICE O/P EST HI 40 MIN: CPT | Performed by: INTERNAL MEDICINE

## 2021-10-14 RX ORDER — LIDOCAINE 50 MG/G
OINTMENT TOPICAL PRN
COMMUNITY

## 2021-10-14 RX ORDER — CYCLOBENZAPRINE HYDROCHLORIDE 7.5 MG/1
7.5 TABLET, FILM COATED ORAL EVERY EVENING
COMMUNITY

## 2021-10-14 RX ORDER — PROGESTERONE 100 MG/1
CAPSULE ORAL
COMMUNITY

## 2021-10-14 ASSESSMENT — ENCOUNTER SYMPTOMS
SHORTNESS OF BREATH: 0
BRUISES/BLEEDS EASILY: 0
FEVER: 0
CHILLS: 0
LIGHT-HEADEDNESS: 0
HEMATEMESIS: 0
WEIGHT GAIN: 0
SUSPICIOUS LESIONS: 0
HEMOPTYSIS: 0
HEMATOCHEZIA: 0
WEIGHT LOSS: 0
COUGH: 0

## 2021-10-20 ENCOUNTER — TELEPHONE (OUTPATIENT)
Dept: CARDIOLOGY | Age: 66
End: 2021-10-20

## 2021-10-20 RX ORDER — POTASSIUM CHLORIDE 750 MG/1
CAPSULE, EXTENDED RELEASE ORAL
Qty: 100 CAPSULE | Refills: 0 | Status: SHIPPED | OUTPATIENT
Start: 2021-10-20 | End: 2021-12-01

## 2021-11-24 ENCOUNTER — APPOINTMENT (OUTPATIENT)
Dept: CARDIOLOGY | Age: 66
End: 2021-11-24

## 2021-12-01 ENCOUNTER — OFFICE VISIT (OUTPATIENT)
Dept: CARDIOLOGY | Age: 66
End: 2021-12-01

## 2021-12-01 VITALS — OXYGEN SATURATION: 98 % | SYSTOLIC BLOOD PRESSURE: 140 MMHG | HEART RATE: 92 BPM | DIASTOLIC BLOOD PRESSURE: 80 MMHG

## 2021-12-01 DIAGNOSIS — I48.21 PERMANENT ATRIAL FIBRILLATION (CMD): Primary | ICD-10-CM

## 2021-12-01 PROCEDURE — 99214 OFFICE O/P EST MOD 30 MIN: CPT | Performed by: NURSE PRACTITIONER

## 2021-12-01 RX ORDER — DIGOXIN 125 MCG
125 TABLET ORAL DAILY
Qty: 100 TABLET | Refills: 3 | Status: SHIPPED | OUTPATIENT
Start: 2021-12-01 | End: 2022-03-09

## 2021-12-01 RX ORDER — ATENOLOL 25 MG/1
TABLET ORAL
Qty: 30 TABLET | Refills: 6 | Status: SHIPPED | COMMUNITY
Start: 2021-12-01 | End: 2022-03-09 | Stop reason: SDUPTHER

## 2021-12-01 RX ORDER — POTASSIUM CHLORIDE 750 MG/1
CAPSULE, EXTENDED RELEASE ORAL
Qty: 100 CAPSULE | Refills: 0 | Status: SHIPPED | OUTPATIENT
Start: 2021-12-01 | End: 2021-12-01 | Stop reason: ALTCHOICE

## 2021-12-01 RX ORDER — POTASSIUM CHLORIDE 750 MG/1
TABLET, EXTENDED RELEASE ORAL
Qty: 100 TABLET | Refills: 11 | Status: SHIPPED | OUTPATIENT
Start: 2021-12-01 | End: 2021-12-06 | Stop reason: SDUPTHER

## 2021-12-01 ASSESSMENT — ENCOUNTER SYMPTOMS
CHILLS: 0
LIGHT-HEADEDNESS: 0
WEIGHT LOSS: 0
WEIGHT GAIN: 0
HEMOPTYSIS: 0
COUGH: 0
FEVER: 0
SUSPICIOUS LESIONS: 0
HEMATOCHEZIA: 0
HEMATEMESIS: 0
BRUISES/BLEEDS EASILY: 0
SHORTNESS OF BREATH: 0

## 2021-12-06 ENCOUNTER — TELEPHONE (OUTPATIENT)
Dept: CARDIOLOGY | Age: 66
End: 2021-12-06

## 2021-12-06 DIAGNOSIS — R60.0 LOCALIZED EDEMA: Primary | ICD-10-CM

## 2021-12-06 RX ORDER — POTASSIUM CHLORIDE 750 MG/1
TABLET, EXTENDED RELEASE ORAL
Qty: 100 TABLET | Refills: 0 | Status: SHIPPED | OUTPATIENT
Start: 2021-12-06

## 2021-12-08 RX ORDER — POTASSIUM CHLORIDE 750 MG/1
CAPSULE, EXTENDED RELEASE ORAL
Qty: 100 CAPSULE | Refills: 4 | Status: SHIPPED | OUTPATIENT
Start: 2021-12-08

## 2021-12-29 ENCOUNTER — APPOINTMENT (OUTPATIENT)
Dept: CARDIOLOGY | Age: 66
End: 2021-12-29

## 2021-12-29 DIAGNOSIS — R60.0 LOCALIZED EDEMA: ICD-10-CM

## 2021-12-30 ENCOUNTER — TELEPHONE (OUTPATIENT)
Dept: ENDOCRINOLOGY CLINIC | Facility: CLINIC | Age: 66
End: 2021-12-30

## 2021-12-30 NOTE — TELEPHONE ENCOUNTER
Please call patient - good news, thyroid levels are improved but still not at goal.  Increase Levothyroxine to 100mcg PO daily #90, refill 1 and recheck TSH, FT4 in 2-3 months. Thanks.

## 2021-12-31 RX ORDER — LEVOTHYROXINE SODIUM 0.1 MG/1
100 TABLET ORAL
Qty: 90 TABLET | Refills: 1 | Status: SHIPPED | OUTPATIENT
Start: 2021-12-31

## 2022-01-04 ENCOUNTER — TELEPHONE (OUTPATIENT)
Dept: CARDIOLOGY | Age: 67
End: 2022-01-04

## 2022-01-06 ENCOUNTER — TELEPHONE (OUTPATIENT)
Dept: CARDIOLOGY | Age: 67
End: 2022-01-06

## 2022-01-12 ENCOUNTER — APPOINTMENT (OUTPATIENT)
Dept: CARDIOLOGY | Age: 67
End: 2022-01-12

## 2022-01-12 LAB
T4, FREE: 1.3 NG/DL (ref 0.8–1.8)
TSH: 5.06 MIU/L (ref 0.4–4.5)

## 2022-01-13 ENCOUNTER — TELEPHONE (OUTPATIENT)
Dept: ENDOCRINOLOGY CLINIC | Facility: CLINIC | Age: 67
End: 2022-01-13

## 2022-01-13 NOTE — TELEPHONE ENCOUNTER
Dr. Prerna Bravo, see also TE 12/30/21. Patient has appt with cardiologist in early February and is going to ask him about increasing dose to 100mcg daily. She is still taking 88mcg daily. She is still planning on asking cardiologist about increasing dose.  Please

## 2022-01-13 NOTE — TELEPHONE ENCOUNTER
Please call patient - good news, thyroid levels are improved. The level is not quite normal but ok to continue current dose given history of cardiac disease. Thanks.

## 2022-01-13 NOTE — TELEPHONE ENCOUNTER
[id number] I must have received lab results twice. Lets proceed with original plan and have patient to discuss with cardiologist in Feb. Thank you.

## 2022-02-02 ENCOUNTER — APPOINTMENT (OUTPATIENT)
Dept: CARDIOLOGY | Age: 67
End: 2022-02-02

## 2022-03-02 ENCOUNTER — ANCILLARY PROCEDURE (OUTPATIENT)
Dept: CARDIOLOGY | Age: 67
End: 2022-03-02
Attending: NURSE PRACTITIONER

## 2022-03-02 ENCOUNTER — TELEPHONE (OUTPATIENT)
Dept: SCHEDULING | Age: 67
End: 2022-03-02

## 2022-03-02 ENCOUNTER — OFFICE VISIT (OUTPATIENT)
Dept: CARDIOLOGY | Age: 67
End: 2022-03-02

## 2022-03-02 VITALS — DIASTOLIC BLOOD PRESSURE: 80 MMHG | SYSTOLIC BLOOD PRESSURE: 120 MMHG | OXYGEN SATURATION: 98 % | HEART RATE: 94 BPM

## 2022-03-02 DIAGNOSIS — I48.21 PERMANENT ATRIAL FIBRILLATION (CMD): ICD-10-CM

## 2022-03-02 DIAGNOSIS — R00.2 PALPITATIONS: ICD-10-CM

## 2022-03-02 DIAGNOSIS — I48.21 PERMANENT ATRIAL FIBRILLATION (CMD): Primary | ICD-10-CM

## 2022-03-02 PROCEDURE — 99214 OFFICE O/P EST MOD 30 MIN: CPT | Performed by: NURSE PRACTITIONER

## 2022-03-02 ASSESSMENT — ENCOUNTER SYMPTOMS
WEIGHT GAIN: 0
WEIGHT LOSS: 0
FEVER: 0
CHILLS: 0
HEMOPTYSIS: 0
COUGH: 0
BRUISES/BLEEDS EASILY: 0
HEMATOCHEZIA: 0
SHORTNESS OF BREATH: 1
HEMATEMESIS: 0
LIGHT-HEADEDNESS: 0
SUSPICIOUS LESIONS: 0

## 2022-03-07 ENCOUNTER — TELEPHONE (OUTPATIENT)
Dept: SCHEDULING | Age: 67
End: 2022-03-07

## 2022-03-09 RX ORDER — ATENOLOL 25 MG/1
62.5 TABLET ORAL DAILY
Qty: 30 TABLET | Refills: 6 | Status: SHIPPED | COMMUNITY
Start: 2022-03-09

## 2022-03-14 ENCOUNTER — TELEPHONE (OUTPATIENT)
Dept: CARDIOLOGY | Age: 67
End: 2022-03-14

## 2022-03-16 RX ORDER — LEVOTHYROXINE SODIUM 88 UG/1
TABLET ORAL
Qty: 90 TABLET | Refills: 1 | Status: SHIPPED | OUTPATIENT
Start: 2022-03-16

## 2022-03-28 ENCOUNTER — TELEPHONE (OUTPATIENT)
Dept: CARDIOLOGY | Age: 67
End: 2022-03-28

## 2022-04-05 ENCOUNTER — TELEPHONE (OUTPATIENT)
Dept: CARDIOLOGY | Age: 67
End: 2022-04-05

## 2022-05-12 ENCOUNTER — TELEPHONE (OUTPATIENT)
Dept: ENDOCRINOLOGY CLINIC | Facility: CLINIC | Age: 67
End: 2022-05-12

## 2022-05-12 NOTE — TELEPHONE ENCOUNTER
Pt called in stating she has questions about thyroid medicine did not go into much details. She just stated that she wants the thyroid medication dosage changed.  Please follow up

## 2022-05-12 NOTE — TELEPHONE ENCOUNTER
Dr. Deon Malone to patient who stated she is calling with an update. Patient stated she is going to talk to her cardiologist regarding going up on 100 mcg daily of the Levothyroxine, she is still taking 88 mcg. She wanted this sent as an [place].

## 2022-09-26 RX ORDER — LEVOTHYROXINE SODIUM 88 UG/1
TABLET ORAL
Qty: 90 TABLET | Refills: 0 | Status: SHIPPED | OUTPATIENT
Start: 2022-09-26

## 2022-10-20 ENCOUNTER — TELEPHONE (OUTPATIENT)
Dept: ENDOCRINOLOGY CLINIC | Facility: CLINIC | Age: 67
End: 2022-10-20

## 2022-10-20 NOTE — TELEPHONE ENCOUNTER
Pt called and was very upset because she went to the [facility] office today for her appt but appt was at [facility]. Can she be seen sooner than first available in [facility]? No appts available. Please call.   Please call today or tomorrow between 11am and 1pm.

## 2022-10-26 ENCOUNTER — OFFICE VISIT (OUTPATIENT)
Dept: ENDOCRINOLOGY CLINIC | Facility: CLINIC | Age: 67
End: 2022-10-26
Payer: MEDICARE

## 2022-10-26 ENCOUNTER — LAB ENCOUNTER (OUTPATIENT)
Dept: LAB | Age: 67
End: 2022-10-26
Attending: INTERNAL MEDICINE
Payer: MEDICARE

## 2022-10-26 VITALS — DIASTOLIC BLOOD PRESSURE: 75 MMHG | HEART RATE: 96 BPM | SYSTOLIC BLOOD PRESSURE: 119 MMHG

## 2022-10-26 DIAGNOSIS — E06.3 HYPOTHYROIDISM DUE TO HASHIMOTO'S THYROIDITIS: Primary | ICD-10-CM

## 2022-10-26 DIAGNOSIS — E04.1 THYROID NODULE: ICD-10-CM

## 2022-10-26 DIAGNOSIS — E03.8 HYPOTHYROIDISM DUE TO HASHIMOTO'S THYROIDITIS: Primary | ICD-10-CM

## 2022-10-26 LAB
T4 FREE SERPL-MCNC: 1.1 NG/DL (ref 0.8–1.7)
TSI SER-ACNC: 4.56 MIU/ML (ref 0.36–3.74)

## 2022-10-26 PROCEDURE — 36415 COLL VENOUS BLD VENIPUNCTURE: CPT | Performed by: INTERNAL MEDICINE

## 2022-10-26 PROCEDURE — 84439 ASSAY OF FREE THYROXINE: CPT | Performed by: INTERNAL MEDICINE

## 2022-10-26 PROCEDURE — 84443 ASSAY THYROID STIM HORMONE: CPT | Performed by: INTERNAL MEDICINE

## 2022-10-26 PROCEDURE — 99213 OFFICE O/P EST LOW 20 MIN: CPT | Performed by: INTERNAL MEDICINE

## 2022-10-26 RX ORDER — PROPRANOLOL HYDROCHLORIDE 40 MG/1
40 TABLET ORAL 2 TIMES DAILY
COMMUNITY
Start: 2022-09-24

## 2022-10-27 ENCOUNTER — TELEPHONE (OUTPATIENT)
Dept: ENDOCRINOLOGY CLINIC | Facility: CLINIC | Age: 67
End: 2022-10-27

## 2022-10-27 NOTE — TELEPHONE ENCOUNTER
Patient does note have a phone to leave a message or call - she will call the clinic tomorrow (Friday 10/28) to get this message. Please let patient know that her thyroid labs are stable. Ideally I would still like to increase her dose to 100mcg daily as discussed in the past.  She will discuss with her cardiologist on Monday. Thanks.

## 2022-10-28 NOTE — TELEPHONE ENCOUNTER
Dr. Linda Hernandez     Patient understands 100mcg increase dose but requested to fax over lab results to cardiologist to get approval before starting new increased dose. Patient requesting to faxover to cardiologist Dr. May Hidden ([phone]) copy of the thyroid lab as well as mailing results to patient. Lab results faxed to provided fax number and mail prepared and put in mail bin.

## 2022-11-07 ENCOUNTER — TELEPHONE (OUTPATIENT)
Dept: ENDOCRINOLOGY CLINIC | Facility: CLINIC | Age: 67
End: 2022-11-07

## 2022-11-07 DIAGNOSIS — E03.8 HYPOTHYROIDISM DUE TO HASHIMOTO'S THYROIDITIS: Primary | ICD-10-CM

## 2022-11-07 DIAGNOSIS — E06.3 HYPOTHYROIDISM DUE TO HASHIMOTO'S THYROIDITIS: Primary | ICD-10-CM

## 2022-11-07 RX ORDER — LEVOTHYROXINE SODIUM 0.1 MG/1
100 TABLET ORAL
Qty: 100 TABLET | Refills: 1 | Status: SHIPPED | OUTPATIENT
Start: 2022-11-07

## 2022-11-07 NOTE — TELEPHONE ENCOUNTER
Patient is requesting call back from nurse regarding prescription. Patient would not relay name of medication or the reason for call back due to only wanting to speak with a nurse only.

## 2022-11-07 NOTE — TELEPHONE ENCOUNTER
Dr. Lieutenant Lebron,     Please advise. Levothyroxine 88mcg PO daily --> 100mcg once approval from cardiologist Dr. Otilia Medina (10/27). Cardiologist gave ok to change to 100mcg dose    Per patient, \"How soon should she get tested due to increased dose? \"    Patient requests:  -Rx to be sent through mail, no electronic Rx to be sent to pharmacy. -Quanitity of 100 tablets for Levothyroxine 100mcg  -Lab orders to be mailed once verifying frequency. LOV: 10/26   Rx of Levothyroxine pended.

## 2022-11-08 NOTE — TELEPHONE ENCOUNTER
Called patient.  Informed regarding recommendation below and that will be sending mail upon patient's request. Mail in [place] [address]

## 2022-11-28 ENCOUNTER — HOSPITAL ENCOUNTER (OUTPATIENT)
Dept: ULTRASOUND IMAGING | Age: 67
Discharge: HOME | End: 2022-11-28
Attending: INTERNAL MEDICINE
Payer: MEDICARE

## 2022-11-28 ENCOUNTER — TELEPHONE (OUTPATIENT)
Dept: ENDOCRINOLOGY CLINIC | Facility: CLINIC | Age: 67
End: 2022-11-28

## 2022-11-28 DIAGNOSIS — E04.1 THYROID NODULE: ICD-10-CM

## 2022-11-28 PROCEDURE — 76536 US EXAM OF HEAD AND NECK: CPT | Performed by: INTERNAL MEDICINE

## 2022-11-28 NOTE — TELEPHONE ENCOUNTER
Please call patient - good news, her thyroid ultrasound is stable. She does have two small nodules but no need for further evaluation at this time. We will plan to repeat US in one year. Thanks.

## 2022-11-29 NOTE — TELEPHONE ENCOUNTER
Dr. Huma Ridley to patient regarding result notes below. Patient is inquiring if her most recent ultrasound was compared to ultrasound done in 2020?  She also wants to update you that she started Levothyroixne 100 mcg on November 20th

## 2022-11-30 NOTE — TELEPHONE ENCOUNTER
Yes, correct compared to 2020. Her nodules have increased very slightly in size but no concerning features.

## 2022-11-30 NOTE — TELEPHONE ENCOUNTER
Spoke to patient regarding Dr. Dia Vu note below. Patient verbalized understanding, she did not have any additional questions at this time.

## 2023-02-24 ENCOUNTER — TELEPHONE (OUTPATIENT)
Dept: ENDOCRINOLOGY CLINIC | Facility: CLINIC | Age: 68
End: 2023-02-24

## 2023-02-24 DIAGNOSIS — E06.3 HYPOTHYROIDISM DUE TO HASHIMOTO'S THYROIDITIS: Primary | ICD-10-CM

## 2023-02-24 DIAGNOSIS — E03.8 HYPOTHYROIDISM DUE TO HASHIMOTO'S THYROIDITIS: Primary | ICD-10-CM

## 2023-03-03 NOTE — TELEPHONE ENCOUNTER
Spoke to patient and informed labs signed by Dr. Genevieve Blandon and informed will be mailed today. Patient verbalized understanding and had no further questions at this time.

## 2023-03-03 NOTE — TELEPHONE ENCOUNTER
Spoke to patient who was very upset as she has not received a call in a week. RN apologized to patient and attempted to explain influx of calls have increased. Again apologized to patient. Patient continued to be upset, RN provided patient with [facility] number to provide feedback and concerns. Dr. Andres Olson, patient is requesting T3 profile to be added along with T4 and TSH orders. Confirmed with patient that she will be going to [facility] to have labs drawn as \"I will not drive out there just for labs, [facility] is more convenient\". Orders pended for you to review and sign off. Orders changed to reflect [facility] as preferred lab. Patient requesting a call back once orders are placed and mailed to her.

## 2023-03-03 NOTE — TELEPHONE ENCOUNTER
Pt called and is very upset that she has not received a call back. Please call. Attempted to transfer to clinical staff but pt refused.

## 2023-03-24 ENCOUNTER — TELEPHONE (OUTPATIENT)
Dept: ENDOCRINOLOGY CLINIC | Facility: CLINIC | Age: 68
End: 2023-03-24

## 2023-03-24 DIAGNOSIS — E06.3 HYPOTHYROIDISM DUE TO HASHIMOTO'S THYROIDITIS: Primary | ICD-10-CM

## 2023-03-24 DIAGNOSIS — E03.8 HYPOTHYROIDISM DUE TO HASHIMOTO'S THYROIDITIS: Primary | ICD-10-CM

## 2023-03-24 LAB
T3, FREE: 2.3 PG/ML (ref 2.3–4.2)
T4, FREE: 1.5 NG/DL (ref 0.8–1.8)
TSH: 5.39 MIU/L (ref 0.4–4.5)

## 2023-03-24 RX ORDER — LIOTHYRONINE SODIUM 5 UG/1
5 TABLET ORAL DAILY
Qty: 90 TABLET | Refills: 1 | Status: SHIPPED | OUTPATIENT
Start: 2023-03-24

## 2023-03-24 RX ORDER — LEVOTHYROXINE SODIUM 0.1 MG/1
100 TABLET ORAL
Qty: 90 TABLET | Refills: 1 | Status: SHIPPED | OUTPATIENT
Start: 2023-03-24

## 2023-03-24 NOTE — TELEPHONE ENCOUNTER
Spoke to patient to relay message below - patient stated understanding and will start liothyronine 5mcg daily - patient stated she will monitor for palpitations  Patient stated understanding to repeat labs in 2 months  RX for liothyronine 5mcg daily and levothyroxine 100mcg daily printed and mailed to patient  Labs ordered and copy of order mailed to patient

## 2023-03-24 NOTE — TELEPHONE ENCOUNTER
Ok to add T3. However please let her know that this type of hormone supplementation does have higher risk of palpitations. In the past we have avoided this type of hormone for this reason. Ok to send Liothyronine 5mcg PO daily #90, refill 1 and recheck TSH, FT4, FT3 in 2 months. Thanks.

## 2023-03-24 NOTE — TELEPHONE ENCOUNTER
Please call patient - her thyroid levels are stable. Is she taking the 100mcg tablets of levothyroxine? How is she feeling on medication? Thanks.

## 2023-03-24 NOTE — TELEPHONE ENCOUNTER
Dr. Jesse Millard to patient to relay message below - patient confirms taking 100mcg levothyroxine daily - she states she feels fine, has noticed some improvement  Patient requesting T3 correction (either liotrix or cytomel) since T3 in low normal  Patient requesting refill of 100mcg levothyroxine  Patient requesting PRINT copies of RXs mailed to her home  Copy of labs mailed to patient's home    Please advise on T3 -thanks

## 2023-03-31 ENCOUNTER — TELEPHONE (OUTPATIENT)
Dept: ENDOCRINOLOGY CLINIC | Facility: CLINIC | Age: 68
End: 2023-03-31

## 2023-03-31 DIAGNOSIS — E61.8 IODINE DEFICIENCY: Primary | ICD-10-CM

## 2023-04-02 NOTE — TELEPHONE ENCOUNTER
Referral sent to [facility] per pt and physician request. They will be contacting pt directly to schedule.     Kathleen Bradshaw MSW, LSW There is not a blood test for iodine level. If she really wants to proceed with iodine testing then it would be 24 hour urine collection. I do not think this is necessary since we have iodinated salt and water. Ok to place order if she wants to proceed.

## 2023-04-03 NOTE — TELEPHONE ENCOUNTER
Order placed.   However please let patient know that I received a warning Medicare does not cover this test.

## 2023-04-03 NOTE — TELEPHONE ENCOUNTER
Dr. Speedy Alfonso to patient - she would like to proceed with iodine urine testing  Spoke to lab - [id number] tests iodine in urine  Lab pended for DX code and approval -thanks    RNs - please mail copy of lab order to patient

## 2023-04-03 NOTE — TELEPHONE ENCOUNTER
Lab order printed and mailed to patient - note attached to order advising patient that Medicare does not cover test

## 2023-05-24 ENCOUNTER — TELEPHONE (OUTPATIENT)
Dept: ENDOCRINOLOGY CLINIC | Facility: CLINIC | Age: 68
End: 2023-05-24

## 2023-07-27 ENCOUNTER — TELEPHONE (OUTPATIENT)
Dept: ENDOCRINOLOGY CLINIC | Facility: CLINIC | Age: 68
End: 2023-07-27

## 2023-07-27 LAB
T3, FREE: 2.8 PG/ML (ref 2.3–4.2)
T4, FREE: 1.2 NG/DL (ref 0.8–1.8)
TSH: 2.22 MIU/L (ref 0.4–4.5)

## 2023-07-27 NOTE — TELEPHONE ENCOUNTER
Received fax from [facility] attached is lab results collected on 07/26/23, results placed in provider folder for review.

## 2023-10-24 ENCOUNTER — TELEPHONE (OUTPATIENT)
Dept: ENDOCRINOLOGY CLINIC | Facility: CLINIC | Age: 68
End: 2023-10-24

## 2023-10-24 DIAGNOSIS — E04.1 THYROID NODULE: Primary | ICD-10-CM

## 2023-10-24 NOTE — TELEPHONE ENCOUNTER
Patient calling states what would be the next steps and what is recommended by Dr Soto, states to call before 11 preferred.

## 2023-10-27 NOTE — TELEPHONE ENCOUNTER
Dr. Soto per last TE 11/28/22 pt to have repeat US of thyroid completed in 1 year, order pended . Would you like any labs to be completed.? LOV 10/26/22, Pt missed yearly follow up appointment .

## 2023-10-27 NOTE — TELEPHONE ENCOUNTER
Per pt requesting thyroid labs from July  to be faxed to Dr.Arthur Grijalva to be faxed to her cardiologist  [phone]. Results faxed.

## 2023-10-31 NOTE — TELEPHONE ENCOUNTER
Patient is calling states that she cannot do ultrasound until spring time since she doesn't do appointments in the winter. Please call

## 2023-11-06 NOTE — TELEPHONE ENCOUNTER
Called and spoke to patient, appointment booked for 4/24/23 (pt declined vv and appointment in winter time. was okay with booking in April). While on the phone with patient, informed her to have US Thyroid done, gave patient phone number for central scheduling. Patient was also asking for updated labs, labs pended below. Dr. Soto please advise if you'd like to have additional labs drawn. Please route back to MA's as patient wants labs to be mailed to her home address.

## 2024-02-09 ENCOUNTER — TELEPHONE (OUTPATIENT)
Dept: ENDOCRINOLOGY CLINIC | Facility: CLINIC | Age: 69
End: 2024-02-09

## 2024-02-09 DIAGNOSIS — E03.8 HYPOTHYROIDISM DUE TO HASHIMOTO'S THYROIDITIS: ICD-10-CM

## 2024-02-09 DIAGNOSIS — E06.3 HYPOTHYROIDISM DUE TO HASHIMOTO'S THYROIDITIS: ICD-10-CM

## 2024-02-14 RX ORDER — LIOTHYRONINE SODIUM 5 UG/1
5 TABLET ORAL DAILY
Qty: 100 TABLET | Refills: 2 | Status: SHIPPED | OUTPATIENT
Start: 2024-02-14

## 2024-02-14 NOTE — TELEPHONE ENCOUNTER
Pt calling again.  She is very upset that she has not received a return call.  Pt refused to give additional information and is requesting to speak with management.

## 2024-02-14 NOTE — TELEPHONE ENCOUNTER
Called patient back regarding her request to speak to management. Discussed patient concerns about length of time it takes for a return call. Patient requesting a paper script for her T3 medication liothyronine due to unavailability at current pharmacy. She wants to take the script to a different pharmacy that has the manufacture sealed bottle. Script placed in mail.

## 2024-03-05 ENCOUNTER — TELEPHONE (OUTPATIENT)
Dept: ENDOCRINOLOGY CLINIC | Facility: CLINIC | Age: 69
End: 2024-03-05

## 2024-03-05 DIAGNOSIS — E03.8 HYPOTHYROIDISM DUE TO HASHIMOTO'S THYROIDITIS: ICD-10-CM

## 2024-03-05 DIAGNOSIS — E06.3 HYPOTHYROIDISM DUE TO HASHIMOTO'S THYROIDITIS: ICD-10-CM

## 2024-03-05 LAB
T3, FREE: 2.9 PG/ML (ref 2.3–4.2)
T4, FREE: 1.5 NG/DL (ref 0.8–1.8)
TSH: 1.42 MIU/L (ref 0.4–4.5)

## 2024-03-06 RX ORDER — LEVOTHYROXINE SODIUM 0.1 MG/1
100 TABLET ORAL
Qty: 100 TABLET | Refills: 0 | Status: SHIPPED | OUTPATIENT
Start: 2024-03-06

## 2024-03-06 RX ORDER — LIOTHYRONINE SODIUM 5 UG/1
5 TABLET ORAL DAILY
Qty: 100 TABLET | Refills: 0 | Status: SHIPPED | OUTPATIENT
Start: 2024-03-06

## 2024-03-06 NOTE — TELEPHONE ENCOUNTER
Spoke to patient. Patient is very upset indicating that she has been wafting for a response with regards to thyroid lab results.   TSH, T3 and T4  lab results interpretation read to the patient as indicated by Dr Soto.   Patient has requested a paper prescription for levothyroxine 100 mcg  and Liothyronine 5 mcg to be mailed.  Rx printed and mailed.

## 2024-04-09 ENCOUNTER — HOSPITAL ENCOUNTER (OUTPATIENT)
Dept: ULTRASOUND IMAGING | Age: 69
Discharge: HOME | End: 2024-04-09
Attending: INTERNAL MEDICINE
Payer: MEDICARE

## 2024-04-09 DIAGNOSIS — E04.1 THYROID NODULE: ICD-10-CM

## 2024-04-09 PROCEDURE — 76536 US EXAM OF HEAD AND NECK: CPT | Performed by: INTERNAL MEDICINE

## 2024-04-17 ENCOUNTER — TELEPHONE (OUTPATIENT)
Dept: ENDOCRINOLOGY | Age: 69
End: 2024-04-17

## 2024-04-24 ENCOUNTER — OFFICE VISIT (OUTPATIENT)
Dept: ENDOCRINOLOGY CLINIC | Facility: CLINIC | Age: 69
End: 2024-04-24

## 2024-04-24 VITALS — BODY MASS INDEX: 36 KG/M2 | HEIGHT: 65 IN

## 2024-04-24 DIAGNOSIS — E03.8 HYPOTHYROIDISM DUE TO HASHIMOTO'S THYROIDITIS: ICD-10-CM

## 2024-04-24 DIAGNOSIS — E06.3 HYPOTHYROIDISM DUE TO HASHIMOTO'S THYROIDITIS: ICD-10-CM

## 2024-04-24 PROCEDURE — 99213 OFFICE O/P EST LOW 20 MIN: CPT | Performed by: INTERNAL MEDICINE

## 2024-04-24 RX ORDER — LIOTHYRONINE SODIUM 5 UG/1
5 TABLET ORAL DAILY
Qty: 100 TABLET | Refills: 3 | Status: SHIPPED | OUTPATIENT
Start: 2024-04-24

## 2024-04-24 RX ORDER — LEVOTHYROXINE SODIUM 0.1 MG/1
100 TABLET ORAL
Qty: 100 TABLET | Refills: 3 | Status: SHIPPED | OUTPATIENT
Start: 2024-04-24

## 2024-04-24 NOTE — PROGRESS NOTES
Name: Germaine Davis  Date: 4/24/2024    Referring Physician: No ref. provider found    Chief Complaint   Patient presents with    Hypothyroidism       HISTORY OF PRESENT ILLNESS   Germaine Davis is a 69 year old female who presents for   Chief Complaint   Patient presents with    Hypothyroidism     70 y/o F presents for follow up evaluation of hypothyroidism.  She was diagnosed with thyroid disease diagnosed 20 years ago.  The control of hypothyroidism has been complicated by chronic Afib.  In the past when her TSH was lower she had significant tachycardia.      Since last visit she has been maintained on Levothyroxine 100mcg PO daily and Liothyronine 5mcg PO daily.  She is taking in AM and waiting 30-60 min before eating.  She continues to have some fatigue but stable.  No change in energy level with changes in thyroid dose. No palpitations.  She does have Afib followed by cardiology.     No significant compression symptoms in neck.     Thyroid US demonstrated stable nodules 4/2023     Labs: TSH 3.15 12/2019    REVIEW OF SYSTEMS  Eyes: no change in vision  Neurologic: no headache, generalized or focal weakness or numbness.  Head: normal  ENT: normal  Lungs: no shortness of breath, wheezing or CONTRERAS  Cardiovascular:  no chest pain or palpitations  Gastrointestinal:  no abdominal pain, bowel movement problems  Musculoskeletal: no muscle pain or arthralgia  /Gyne: no frequency or discomfort while urinating  Psychiatric:  no acute distress, anxiety  or depression  Skin: normal moisturized skin    Medications:     Current Outpatient Medications:     levothyroxine 100 MCG Oral Tab, Take 1 tablet (100 mcg total) by mouth before breakfast., Disp: 100 tablet, Rfl: 0    liothyronine 5 MCG Oral Tab, Take 1 tablet (5 mcg total) by mouth daily., Disp: 100 tablet, Rfl: 0    propranolol 40 MG Oral Tab, Take 40 mg by mouth in the morning and 40 mg before bedtime., Disp: , Rfl:     progesterone 100 MG Oral Cap, Take 1 capsule (100 mg  total) by mouth nightly. Pt to have medication in a manufactured sealed bottle., Disp: 100 capsule, Rfl: 2    Potassium Chloride ER 10 MEQ Oral Cap CR, take 1 capsule by mouth 3 to 4 times daily as directed, Disp: , Rfl: 9    Cyclobenzaprine HCl 7.5 MG Oral Tab, Take 7.5 mg by mouth 3 (three) times daily as needed for Muscle spasms., Disp: , Rfl:     Multiple Vitamins-Minerals (MULTI-VITAMIN/MINERALS) Oral Tab, Take 1 tablet by mouth daily., Disp: , Rfl:      Allergies:   Allergies   Allergen Reactions    Penicillins HIVES       Social History:   Social History     Socioeconomic History    Marital status:    Tobacco Use    Smoking status: Never    Smokeless tobacco: Never   Vaping Use    Vaping status: Never Used   Substance and Sexual Activity    Alcohol use: No    Drug use: No    Sexual activity: Never       Medical History:   Past Medical History:    Fibromyalgia    Hashimoto's thyroiditis    Hypothyroidism       Surgical history:   Past Surgical History:   Procedure Laterality Date    Breast lumpectomy      Hysterectomy      Other surgical history  2017    vein surgery    Other surgical history  01/2004    myomectomy       PHYSICAL EXAMINATION:  Ht 5' 5\" (1.651 m)   LMP  (LMP Unknown)   BMI 36.11 kg/m²     General Appearance:  Alert, in no acute distress, well developed  Eyes: normal conjunctivae, sclera.  Ears/Nose/Mouth/Throat/Neck:  normal hearing, normal speech   Neurologic: sensory grossly intact and motor grossly intact  Musculoskeletal:  normal muscle strength and tone  PV: normal pulses of carotids, pedals  Skin:  normal moisture and skin texture  Hair & Nails:  normal scalp hair     Neuro:  sensory grossly intact and motor grossly intact  Psychiatric:  oriented to time, self, and place  Nutritional:  no abnormal weight gain or loss    ASSESSMENT/PLAN:    1. Hypothyroidism  - Discussed diagnosis with patient  - Discussed importance of long term treatment  - Continue Levothyroxine 100mcg PO  daily   - Continue Liothyronine 5mcg PO daily   - Normal TFTs     2. Thyroid Nodules  - Nodules stable  - Repeat 2025     RTC 1 year     4/24/2024  Liya Soto MD

## 2024-07-09 DIAGNOSIS — E06.3 HYPOTHYROIDISM DUE TO HASHIMOTO'S THYROIDITIS: ICD-10-CM

## 2024-07-11 RX ORDER — LEVOTHYROXINE SODIUM 0.1 MG/1
100 TABLET ORAL
Qty: 90 TABLET | Refills: 1 | Status: SHIPPED | OUTPATIENT
Start: 2024-07-11

## 2024-07-11 NOTE — TELEPHONE ENCOUNTER
Endocrine refill protocol for medications for hypothyroidism and hyperthyroidism    Protocol Criteria: passed  Appointment with Endocrinology completed in the last 12 months or scheduled in the next 6 months     Verify appointment has been completed or scheduled in the appropriate timeline. If so can send a 90 day supply with 1 refill per provider protocol.    Normal TSH result in the past 12 months   Review recent telephone encounters and mychart communications with patient to ensure a dose change has not occurred since last office visit that was not updated in the medication history list   Last completed office visit:4/24/2024 Liya Soto MD   Next scheduled Follow up: No future appointments.   Last TSH result:   TSH   Date Value Ref Range Status   03/04/2024 1.42 0.40 - 4.50 mIU/L Final

## 2024-09-05 ENCOUNTER — TELEPHONE (OUTPATIENT)
Dept: ENDOCRINOLOGY CLINIC | Facility: CLINIC | Age: 69
End: 2024-09-05

## 2024-09-10 NOTE — TELEPHONE ENCOUNTER
Called patient and states she is interested in trying a new thryoid medication called Adthyza. States it is derived from animal thyroid glands- She is interested in starting on lowest dosage 15mg, and would like to lower the levothyroxine 100mcg, if Dr Soto is agreeable. She also states she was wondering if Dr. Soto new about and new T3 time releasing thyroid medication available?

## 2024-09-11 RX ORDER — LEVOTHYROXINE AND LIOTHYRONINE 38; 9 UG/1; UG/1
60 TABLET ORAL DAILY
Qty: 90 TABLET | Refills: 1 | Status: SHIPPED | OUTPATIENT
Start: 2024-09-11

## 2024-09-11 RX ORDER — LEVOTHYROXINE AND LIOTHYRONINE 57; 13.5 UG/1; UG/1
90 TABLET ORAL DAILY
Qty: 90 TABLET | Refills: 1 | Status: SHIPPED | OUTPATIENT
Start: 2024-09-11

## 2024-09-11 RX ORDER — LEVOTHYROXINE AND LIOTHYRONINE 57; 13.5 UG/1; UG/1
90 TABLET ORAL DAILY
Qty: 90 TABLET | Refills: 1 | Status: SHIPPED | OUTPATIENT
Start: 2024-09-11 | End: 2024-09-11

## 2024-09-11 NOTE — TELEPHONE ENCOUNTER
Dr Soto,    I spoke to patient and she would like to try Adthyza instead of levothyroxine 100 mcg daily.     Requesting to send paper prescription to her address.

## 2024-09-11 NOTE — TELEPHONE ENCOUNTER
Adthyza is dessicated thyroid hormone therefore can not be combined with levothyroxine.  We could change her completely over to this form of thyroid medicine.  However suspect it will not be covered by Medicare.  Time release T3 is still in clinical trials and not yet approved by the FDA, as far as I know at the moment.  The only time release T3 available is through compounded pharmacies which again would not necessarily be covered by Medicare.

## 2024-09-13 NOTE — TELEPHONE ENCOUNTER
Ok, noted.  Please ask her to stop the Liothyronine and Levothyroxine.  Just continue the Adthyza which contains both T3 and T4. Thanks.

## 2024-09-13 NOTE — TELEPHONE ENCOUNTER
Spoke with pt and informed her that prescription has been sent.     Pt asks if she should continue to take 5mcg dose of T3 along with Adthyza.     Informed pt that RN would send message to Dr Soto to clarify.    Pt then asks if Sunita would call pt back.  RN asked for clarification on this.  Pt states she has an issue and needs to talk to the clinic manager. Pt did not disclose reason.    RN able to transfer pt call to DAVID Rodriguez. Pt agreeable. Call transferred.

## 2024-09-27 ENCOUNTER — TELEPHONE (OUTPATIENT)
Dept: ENDOCRINOLOGY CLINIC | Facility: CLINIC | Age: 69
End: 2024-09-27

## 2024-09-27 NOTE — TELEPHONE ENCOUNTER
Patient is requesting to speak to the nurse about medication. Patient would not provide name of medication and prefers to speak to the nurse to discuss further.

## 2024-10-01 NOTE — TELEPHONE ENCOUNTER
Dr. Soto -- spoke to pt. Pt has not started adythza yet, she is still taking liothyronine and levothyroxine.     Pt wants to know if Adthyza can be taken in an alternate schedule with liothyronine and levothyroxine? Pt states she feels more comfortable doing it like this so her body can get acclimated to the new medication.     Example: 1 day adthyza, 2nd day liothyronine 5 mcg and levothyroxine 100 mcg, 3rd day Adthyza    RN reviewed per TE on 9/5 that adthyza should not be taken in combination with levothyroxine. Pt still wanted RN to still forward message to provider.

## 2024-10-01 NOTE — TELEPHONE ENCOUNTER
No these drugs should not be combined.  They are different medications and that would lead to abnormal T3 levels in her system.  She needs to make a full switch if changing medication.

## 2024-10-01 NOTE — TELEPHONE ENCOUNTER
Spoke to pt. Provided recommendations as written below by Dr. Soto. Pt verbalized understanding, pt will continue with adthyza. Denies further questions or concerns.

## 2024-12-13 ENCOUNTER — TELEPHONE (OUTPATIENT)
Dept: ENDOCRINOLOGY CLINIC | Facility: CLINIC | Age: 69
End: 2024-12-13

## 2024-12-13 DIAGNOSIS — E06.3 HYPOTHYROIDISM DUE TO HASHIMOTO'S THYROIDITIS: Primary | ICD-10-CM

## 2024-12-13 NOTE — TELEPHONE ENCOUNTER
Patient requesting to speak with RN - did not leave details.  Please call - best time to call is between 11am and 1pm.  Thank you.

## 2024-12-19 NOTE — TELEPHONE ENCOUNTER
Orders printed and mailed to patient. Called and notified patient.     patient wanted to let Dr Soto know that she started medication on 10/19/24 due to cost and having a hard time finding a supplier of the medication. She wanted Dr Soto to be aware in case her lab results were abnormal.

## 2024-12-19 NOTE — TELEPHONE ENCOUNTER
Called patient. States she would like lab orders to see if her current dose of Adthyza is appropriate for her (prescribed on 9/11).     She would like lab orders mailed to her, and requests call back when orders are mailed.     She wanted to let Dr Soto know that she did have TSH w Reflex drawn on 11/6/24 and result was 1.540.    She would like all her levels checked (T3, T4, TSH).     Orders pended.

## 2025-01-09 ENCOUNTER — TELEPHONE (OUTPATIENT)
Dept: ENDOCRINOLOGY CLINIC | Facility: CLINIC | Age: 70
End: 2025-01-09

## 2025-01-09 NOTE — TELEPHONE ENCOUNTER
Patient is requesting orders for labs.   Patient has voiced that she wants to speak with an RN and she needs a call back ASAP.  Patient voiced that she is very upset that she can not speak to someone now.     There are lab orders but she does not go to [place] and wants a printed copy to take elsewhere.  Patient did not specify what lab.     Please call

## 2025-01-09 NOTE — TELEPHONE ENCOUNTER
Spoke to patient - she stated she has not received lab orders mailed on 12/1924 - patient advised that our mail is slow and can take 2-3 weeks   Patient requested lab orders be mailed again - TSH, T4 and T3 lab orders mailed    Patient stated she will go to one of 2 [facility] labs close to home - RN offered to fax orders - lab orders coded for [facility] and faxed to:  [facility] in [place] - fax # [phone] and  [facility] in [place] - fax #[phone]     Patient given # for livia

## 2025-03-29 LAB
T3, FREE: 3.9 PG/ML (ref 2.3–4.2)
T4, FREE: 1 NG/DL (ref 0.8–1.8)
TSH: 3.9 MIU/L (ref 0.4–4.5)

## 2025-04-02 ENCOUNTER — TELEPHONE (OUTPATIENT)
Dept: ENDOCRINOLOGY CLINIC | Facility: CLINIC | Age: 70
End: 2025-04-02

## 2025-04-04 NOTE — TELEPHONE ENCOUNTER
Dr. Soto --    To clarify, offer res 24/Thursday slot or first available?    Per lab result comment on 3/28:   Liya Soto MD  4/3/2025  5:04 PM CDT Back to Top      Please call patient to schedule yearly visit to review labs. Thanks.

## 2025-04-07 RX ORDER — LEVOTHYROXINE AND LIOTHYRONINE 38; 9 UG/1; UG/1
60 TABLET ORAL DAILY
Qty: 90 TABLET | Refills: 1 | Status: SHIPPED | OUTPATIENT
Start: 2025-04-07

## 2025-04-07 NOTE — TELEPHONE ENCOUNTER
Called pt. Pt unhappy with call. Declined offered appt in [facility]. Pt stated she will only go to [facility]. Pt stated she needed medication refills, pt wants to know lab result. Stated to pt that the appt is to review labs. Informed pt I will ask provider which appt I can offer her in [facility] and inform her of refills. Pt proceeded to hang up the phone.     Spoke with provider. Provider stated we can offer 12 or 12:15 in [facility]

## 2025-04-07 NOTE — TELEPHONE ENCOUNTER
Called and spoke to patient, appointment scheduled for 4/23/25 with MD in [facility]. While on the phone patient stated that she is completely out of medication and would like to know if dosages will change. Patient would like to be notified if dosage strength is changed and to notify patient before script is sent electronically and that 90 days is to be dispensed.

## 2025-04-09 DIAGNOSIS — E06.3 HYPOTHYROIDISM DUE TO HASHIMOTO'S THYROIDITIS: Primary | ICD-10-CM

## 2025-04-09 DIAGNOSIS — E04.1 THYROID NODULE: ICD-10-CM

## 2025-04-09 RX ORDER — LEVOTHYROXINE AND LIOTHYRONINE 57; 13.5 UG/1; UG/1
90 TABLET ORAL DAILY
Qty: 180 TABLET | Refills: 0 | Status: SHIPPED | OUTPATIENT
Start: 2025-04-09

## 2025-04-09 NOTE — TELEPHONE ENCOUNTER
Patient states she needs a refill ADTHYZA states she is requesting a call today to advise the dosage and patient is running low only has three days of medicine left please call today

## 2025-04-09 NOTE — TELEPHONE ENCOUNTER
Dr. Soto,  Spoke to pharmacist at [facility]:  Adthyza 60mg RX on hold d/t not available to be filled d/t supply (both 60mg and 90 mg tablets) - pharmacist stated there are 2 bottles of 15mg tablets available     Spoke to patient to update - patient stated and checked her bottle that she is taking 90mg Adthyza (RX was mailed to patient 9/11/24)  Patient stated she will call [place] pharmacy to see what her options are - RN will call patient back at 1:15pm for update    Spoke to patient again - she stated patent is expiring for Adthyza and come June it will not be available - patient requesting 180 day supply of Adthyza 90 mg tablet be sent to [facility] (patient has spoken to them and they offer Adthyza at affordable price) - patient would like to purchase 180 day supply to stay on Adthyza for as long as possible    RX pended -thanks

## 2025-04-11 NOTE — TELEPHONE ENCOUNTER
Spoke with patient, she understand she has an upcoming appt with Dr. Soto on 4/23 and will continue her current meds.

## 2025-04-23 ENCOUNTER — OFFICE VISIT (OUTPATIENT)
Dept: ENDOCRINOLOGY CLINIC | Facility: CLINIC | Age: 70
End: 2025-04-23

## 2025-04-23 VITALS — HEART RATE: 80 BPM | DIASTOLIC BLOOD PRESSURE: 58 MMHG | SYSTOLIC BLOOD PRESSURE: 112 MMHG

## 2025-04-23 DIAGNOSIS — E06.3 HYPOTHYROIDISM DUE TO HASHIMOTO'S THYROIDITIS: Primary | ICD-10-CM

## 2025-04-23 PROCEDURE — 99213 OFFICE O/P EST LOW 20 MIN: CPT | Performed by: INTERNAL MEDICINE

## 2025-04-23 RX ORDER — PROPRANOLOL HCL 20 MG
100 TABLET ORAL 2 TIMES DAILY
COMMUNITY
Start: 2025-04-23

## 2025-04-23 NOTE — PATIENT INSTRUCTIONS
Component      Latest Ref Rng 7/26/2023 3/4/2024 3/28/2025   TSH      0.40 - 4.50 mIU/L 2.22  1.42  3.90    T3 FREE      2.3 - 4.2 pg/mL 2.8  2.9  3.9    T4,Free (Direct)      0.8 - 1.8 ng/dL 1.2  1.5  1.0

## 2025-04-23 NOTE — PROGRESS NOTES
Name: Germaine Davis  Date: 4/23/2025    Referring Physician: No ref. provider found    Chief Complaint   Patient presents with    Hypothyroidism       HISTORY OF PRESENT ILLNESS   Germaine Davis is a 70 year old female who presents for   Chief Complaint   Patient presents with    Hypothyroidism     69 y/o F presents for follow up evaluation of hypothyroidism.  She was diagnosed with thyroid disease diagnosed 20 years ago.  The control of hypothyroidism has been complicated by chronic Afib.  In the past when her TSH was lower she had significant tachycardia.      She has now been transitioned to Adthyza and overall feeling better on medication.  She was previously maintained on levothyroxine and Liothyronine but better on current medication.  She is maintained on Adthyza 90mg PO daily, taking in AM and waiting before eating.     No significant compression symptoms in neck.     Thyroid US demonstrated stable nodules 4/2024    Labs: TSH 3.15 12/2019    REVIEW OF SYSTEMS  Eyes: no change in vision  Neurologic: no headache, generalized or focal weakness or numbness.  Head: normal  ENT: normal  Lungs: no shortness of breath, wheezing or CONTRERAS  Cardiovascular:  no chest pain or palpitations  Gastrointestinal:  no abdominal pain, bowel movement problems  Musculoskeletal: no muscle pain or arthralgia  /Gyne: no frequency or discomfort while urinating  Psychiatric:  no acute distress, anxiety  or depression  Skin: normal moisturized skin    Medications:     Current Outpatient Medications:     ADTHYZA 90 MG Oral Tab, Take 1 tablet (90 mg total) by mouth daily., Disp: 180 tablet, Rfl: 0    ADTHYZA 60 MG Oral Tab, Take 1 tablet (60 mg total) by mouth daily., Disp: 90 tablet, Rfl: 1    propranolol 40 MG Oral Tab, Take 40 mg by mouth in the morning and 40 mg before bedtime., Disp: , Rfl:     progesterone 100 MG Oral Cap, Take 1 capsule (100 mg total) by mouth nightly. Pt to have medication in a manufactured sealed bottle., Disp: 100  capsule, Rfl: 2    Potassium Chloride ER 10 MEQ Oral Cap CR, take 1 capsule by mouth 3 to 4 times daily as directed, Disp: , Rfl: 9    Cyclobenzaprine HCl 7.5 MG Oral Tab, Take 7.5 mg by mouth 3 (three) times daily as needed for Muscle spasms., Disp: , Rfl:     Multiple Vitamins-Minerals (MULTI-VITAMIN/MINERALS) Oral Tab, Take 1 tablet by mouth daily., Disp: , Rfl:      Allergies:   Allergies   Allergen Reactions    Penicillins HIVES       Social History:   Social History     Socioeconomic History    Marital status:    Tobacco Use    Smoking status: Never    Smokeless tobacco: Never   Vaping Use    Vaping status: Never Used   Substance and Sexual Activity    Alcohol use: No    Drug use: No    Sexual activity: Never       Medical History:   Past Medical History:    Fibromyalgia    Hashimoto's thyroiditis    Hypothyroidism       Surgical history:   Past Surgical History:   Procedure Laterality Date    Breast lumpectomy      Hysterectomy      Other surgical history  2017    vein surgery    Other surgical history  01/2004    myomectomy       PHYSICAL EXAMINATION:  /58   Pulse 80   LMP  (LMP Unknown)     General Appearance:  Alert, in no acute distress, well developed  Eyes: normal conjunctivae, sclera.  Ears/Nose/Mouth/Throat/Neck:  normal hearing, normal speech   Neurologic: sensory grossly intact and motor grossly intact  Musculoskeletal:  normal muscle strength and tone  PV: normal pulses of carotids, pedals  Skin:  normal moisture and skin texture  Hair & Nails:  normal scalp hair     Neuro:  sensory grossly intact and motor grossly intact  Psychiatric:  oriented to time, self, and place  Nutritional:  no abnormal weight gain or loss    ASSESSMENT/PLAN:    1. Hypothyroidism  - Discussed diagnosis with patient  - Discussed importance of long term treatment  - Continue Adthyza 90mg PO daily   - Normal TFTs   - Check cortisol per patient request    2. Thyroid Nodules  - Nodules stable 4/2024 -->repeat  2026     RTC 1 year     4/24/2024  Liya Soto MD

## (undated) NOTE — LETTER
7/23/2020              Kim Weston        [address] [address]         Dear Amanda Weston,    Per your request here's the copy of Dr. Maryanne Ashley message regarding your thyroid ultrasound:     Viki Medrano, MDPhysicianSigned  10:46 AM

## (undated) NOTE — LETTER
3/5/2024              Germaine Davis        [address] [address]         Dear Germaine,      It was a pleasure to see you at our [facility] office. Good news, your thyroid levels are normal and at goal. Continue current dose of medication and call the office with any questions or concerns. We look forward to seeing you at your appointment on 4/24/2024.             Yours Truly,    Liya Soto MD  [facility]  [address], [address]  [place] [address] [phone]

## (undated) NOTE — LETTER
7/23/2020              Ricky Rear        [address] [address]         Dear Crystal Fu,    Per your request here's the copy of Dr. Madrid Staff messages regarding your thyroid ultrasound.     Ofe HurtadosiciMiki  07/09/2020

## (undated) NOTE — LETTER
9/13/2021              [address] [id number]         Dear Ngozi Mcclure,        Attached is your prescription and appeal letter. Thank you!       Sincerely,    Jordan Tay MD  [facility] ENDO[facility]  [address]